# Patient Record
Sex: MALE | Race: WHITE | NOT HISPANIC OR LATINO | Employment: OTHER | URBAN - METROPOLITAN AREA
[De-identification: names, ages, dates, MRNs, and addresses within clinical notes are randomized per-mention and may not be internally consistent; named-entity substitution may affect disease eponyms.]

---

## 2017-01-09 ENCOUNTER — OFFICE VISIT (OUTPATIENT)
Dept: ENDOCRINOLOGY | Age: 27
End: 2017-01-09

## 2017-01-09 VITALS
SYSTOLIC BLOOD PRESSURE: 110 MMHG | HEIGHT: 65 IN | BODY MASS INDEX: 25.56 KG/M2 | DIASTOLIC BLOOD PRESSURE: 66 MMHG | WEIGHT: 153.4 LBS

## 2017-01-09 DIAGNOSIS — E10.9 TYPE 1 DIABETES MELLITUS WITHOUT COMPLICATION (HCC): Primary | ICD-10-CM

## 2017-01-09 DIAGNOSIS — E55.9 VITAMIN D DEFICIENCY DISEASE: ICD-10-CM

## 2017-01-09 PROCEDURE — 99214 OFFICE O/P EST MOD 30 MIN: CPT | Performed by: NURSE PRACTITIONER

## 2017-01-09 NOTE — MR AVS SNAPSHOT
Rickey Hanson   1/9/2017 3:40 PM   Office Visit    Dept Phone:  [phone]   Encounter #:  [id number]    Provider:  FRANNIE Santos   Department:  [facility] ENDOCRINOLOGY                Your Full Care Plan              Today's Medication Changes          These changes are accurate as of: 1/9/17  4:29 PM.  If you have any questions, ask your nurse or doctor.               New Medication(s)Ordered:     Urine Glucose-Ketones Test strip   Check for urine ketone when sick or if bs is greater than 250 mg/dl   Started by:  FRANNIE Santos            Where to Get Your Medications      These medications were sent to [facility] [facility] - [place], [place] - [address] [facility] - [phone] [facility] [phone]   [address] [facility] [address]     Phone:  [phone]     Urine Glucose-Ketones Test strip                  Your Updated Medication List          This list is accurate as of: 1/9/17  4:29 PM.  Always use your most recent med list.                HUMALOG 100 UNIT/ML injection   Generic drug:  insulin lispro       PX MENS MULTIVITAMINS tablet       RELION GLUCOSE TEST STRIPS test strip   Generic drug:  glucose blood       Urine Glucose-Ketones Test strip   Check for urine ketone when sick or if bs is greater than 250 mg/dl               We Performed the Following     Comprehensive Metabolic Panel     Hemoglobin A1c     Lipid Panel     MicroAlbumin, Urine, Random     T3, Free     TSH     Vitamin D 25 Hydroxy       You Were Diagnosed With        Codes Comments    Type 1 diabetes mellitus without complication    -  Primary ICD-10-CM: E10.9  ICD-9-CM: 250.01     Vitamin D deficiency disease     ICD-10-CM: E55.9  ICD-9-CM: 268.9       Instructions    Increase basal to 1.4 u/hr for 24 hours  Sick day rules reviewed     Patient Instructions History      Upcoming Appointments     Visit Type Date Time Department    OFFICE VISIT 1/9/2017   "3:40 PM [facility]    OFFICE VISIT 5/15/2017  3:20 PM [facility]      MyChart Signup     Our records indicate that you have an active [facility] [employer] account.    You can view your After Visit Summary by going to [employer] and logging in with your [employer] username and password.  If you don't have a [employer] username and password but a parent or guardian has access to your record, the parent or guardian should login with their own [employer] username and password and access your record to view the After Visit Summary.    If you have questions, you can email [email or web address] or call [phone] to talk to our [employer] staff.  Remember, [employer] is NOT to be used for urgent needs.  For medical emergencies, dial 911.               Other Info from Your Visit           Your Appointments     May 15, 2017  3:20 PM EDT   Office Visit with FRANNIE Santos   [facility] (--)    [address] [place] [id number] [phone]           Arrive 15 minutes prior to appointment.              Allergies     No Known Allergies      Reason for Visit     Diabetes no recent labs, pump download, testing BG 2 times daily      Vital Signs     Blood Pressure Height Weight Body Mass Index Smoking Status       110/66 65\" (165.1 cm) 153 lb 6.4 oz (69.6 kg) 25.53 kg/m2 Never Smoker       Problems and Diagnoses Noted     Type 1 diabetes    Vitamin D deficiency disease        "

## 2017-01-09 NOTE — PROGRESS NOTES
"Subjective   Rickey Hanson is a 26 y.o. male is here today for follow-up.  Chief Complaint   Patient presents with   • Diabetes     no recent labs, pump download, testing BG 2 times daily     Visit Vitals   • /66   • Ht 65\" (165.1 cm)   • Wt 153 lb 6.4 oz (69.6 kg)   • BMI 25.53 kg/m2       Current Outpatient Prescriptions:   •  glucose blood (RELION GLUCOSE TEST STRIPS) test strip, ReliOn Blood Glucose Test STRP; Patient Sig: ReliOn Blood Glucose Test STRP 4 TIMES DAILY OR AS NEEDED.; 0; 13-Mar-2015; Active, Disp: , Rfl:   •  insulin lispro (HUMALOG) 100 UNIT/ML injection, Inject  under the skin., Disp: , Rfl:   •  Multiple Vitamins-Minerals (PX MENS MULTIVITAMINS) tablet, Take  by mouth daily., Disp: , Rfl:   •  Urine Glucose-Ketones Test strip, Check for urine ketone when sick or if bs is greater than 250 mg/dl, Disp: 50 each, Rfl: 11  History reviewed. No pertinent family history.  Social History   Substance Use Topics   • Smoking status: Never Smoker   • Smokeless tobacco: None   • Alcohol use None     No Known Allergies      History of Present Illness  Encounter Diagnoses   Name Primary?   • Type 1 diabetes mellitus without complication Yes   • Vitamin D deficiency disease     this is a 26-year-old male patient with a history of type 1 diabetes here today for routine follow-up visit.  His pump download was reviewed as well as his sensor.  He is not checking his blood sugars and his sensor show his blood sugars stay above goal most all the time.  He has had no problems with hypoglycemia.  He is not checking his blood sugars very often.  I reviewed with him sick day rules and signs and symptoms of diabetic ketoacidosis.  He states he was sick recently and was unsure of why his blood sugars were Leonard high despite the fact he was taking insulin.  His last hemoglobin A1c reflects that his diabetes is not under good control.  His wife is pregnant with her first child who is due in 2 weeks.    The following " portions of the patient's history were reviewed and updated as appropriate: allergies, current medications, past family history, past medical history, past social history, past surgical history and problem list.    Review of Systems   Constitutional: Negative for chills.   HENT: Negative for dental problem.    Eyes: Negative for photophobia.   Respiratory: Negative for cough.    Cardiovascular: Negative for leg swelling.   Gastrointestinal: Negative for anal bleeding.   Endocrine: Negative for polyphagia.   Genitourinary: Negative for discharge.   Musculoskeletal: Negative for myalgias.   Skin: Negative for color change.   Allergic/Immunologic: Negative for food allergies.   Neurological: Negative for speech difficulty.   Hematological: Negative for adenopathy.   Psychiatric/Behavioral: The patient is not hyperactive.        Objective   Physical Exam   Constitutional: He is oriented to person, place, and time. He appears well-developed and well-nourished. No distress.   HENT:   Head: Normocephalic and atraumatic.   Right Ear: External ear normal.   Left Ear: External ear normal.   Nose: Nose normal.   Eyes: Pupils are equal, round, and reactive to light. Right eye exhibits no discharge. Left eye exhibits no discharge.   Neck: Normal range of motion. Neck supple. Carotid bruit is not present. No tracheal deviation, no edema and no erythema present. No thyromegaly present.   Cardiovascular: Normal rate, regular rhythm, normal heart sounds and intact distal pulses.  Exam reveals no gallop and no friction rub.    No murmur heard.  Pulmonary/Chest: Effort normal and breath sounds normal. No respiratory distress. He has no wheezes. He has no rales.   Abdominal: Soft. Bowel sounds are normal. He exhibits no distension. There is no tenderness.   Musculoskeletal: Normal range of motion. He exhibits no edema or deformity.   Lymphadenopathy:     He has no cervical adenopathy.   Neurological: He is alert and oriented to person,  place, and time. Coordination normal.   Skin: Skin is warm and dry. No rash noted. He is not diaphoretic. No erythema. No pallor.   Psychiatric: He has a normal mood and affect. His behavior is normal. Judgment and thought content normal.   Nursing note and vitals reviewed.        Assessment/Plan   Rickey was seen today for diabetes.    Diagnoses and all orders for this visit:    Type 1 diabetes mellitus without complication    Vitamin D deficiency disease    Other orders  -     Urine Glucose-Ketones Test strip; Check for urine ketone when sick or if bs is greater than 250 mg/dl        In summary, patient was seen and examined.  He has not had any recent labs done.  His pump download of his Medtronic pump was reviewed.  I have increased his basal starting at midnight to 1.4 units per hour for 24 hours.  He has been cautioned to monitor for hypoglycemia.  I will have him follow up with the Medtronic clinical educator for further adjustments to his pump settings as well as help with setting his sensor Ranges.  He will have extensive laboratory evaluation done today and will be notified of the results once received.  He will follow-up with Dr. Styles or myself in 4 months with labs prior.  He was educated today on sick day rules and testing for urine ketones.

## 2017-01-10 LAB
25(OH)D3+25(OH)D2 SERPL-MCNC: 18.7 NG/ML (ref 30–100)
ALBUMIN SERPL-MCNC: 4.9 G/DL (ref 3.5–5.2)
ALBUMIN/GLOB SERPL: 2.1 G/DL
ALP SERPL-CCNC: 106 U/L (ref 39–117)
ALT SERPL-CCNC: 21 U/L (ref 1–41)
AST SERPL-CCNC: 15 U/L (ref 1–40)
BILIRUB SERPL-MCNC: 0.5 MG/DL (ref 0.1–1.2)
BUN SERPL-MCNC: 17 MG/DL (ref 6–20)
BUN/CREAT SERPL: 17.2 (ref 7–25)
CALCIUM SERPL-MCNC: 10.3 MG/DL (ref 8.6–10.5)
CHLORIDE SERPL-SCNC: 100 MMOL/L (ref 98–107)
CHOLEST SERPL-MCNC: 156 MG/DL (ref 0–200)
CO2 SERPL-SCNC: 28.4 MMOL/L (ref 22–29)
CREAT SERPL-MCNC: 0.99 MG/DL (ref 0.76–1.27)
GLOBULIN SER CALC-MCNC: 2.3 GM/DL
GLUCOSE SERPL-MCNC: 133 MG/DL (ref 65–99)
HBA1C MFR BLD: 8.99 % (ref 4.8–5.6)
HDLC SERPL-MCNC: 46 MG/DL (ref 40–60)
LDLC SERPL CALC-MCNC: 77 MG/DL (ref 0–100)
MICROALBUMIN UR-MCNC: 28.7 UG/ML
POTASSIUM SERPL-SCNC: 4.2 MMOL/L (ref 3.5–5.2)
PROT SERPL-MCNC: 7.2 G/DL (ref 6–8.5)
SODIUM SERPL-SCNC: 144 MMOL/L (ref 136–145)
T3FREE SERPL-MCNC: 3.4 PG/ML (ref 2–4.4)
TRIGL SERPL-MCNC: 164 MG/DL (ref 0–150)
TSH SERPL DL<=0.005 MIU/L-ACNC: 0.85 MIU/ML (ref 0.27–4.2)
VLDLC SERPL CALC-MCNC: 32.8 MG/DL (ref 5–40)

## 2017-01-10 RX ORDER — ERGOCALCIFEROL 1.25 MG/1
CAPSULE ORAL
Qty: 12 CAPSULE | Refills: 1 | Status: SHIPPED | OUTPATIENT
Start: 2017-01-10 | End: 2017-10-02 | Stop reason: SDUPTHER

## 2017-04-25 DIAGNOSIS — IMO0002 UNCONTROLLED TYPE 1 DIABETES MELLITUS: ICD-10-CM

## 2017-04-26 DIAGNOSIS — IMO0002 UNCONTROLLED TYPE 1 DIABETES MELLITUS WITH COMPLICATION: ICD-10-CM

## 2017-05-03 DIAGNOSIS — IMO0002 UNCONTROLLED TYPE 1 DIABETES MELLITUS WITH COMPLICATION: ICD-10-CM

## 2017-10-02 ENCOUNTER — OFFICE VISIT (OUTPATIENT)
Dept: ENDOCRINOLOGY | Age: 27
End: 2017-10-02

## 2017-10-02 VITALS
SYSTOLIC BLOOD PRESSURE: 126 MMHG | DIASTOLIC BLOOD PRESSURE: 68 MMHG | HEIGHT: 65 IN | WEIGHT: 153.4 LBS | BODY MASS INDEX: 25.56 KG/M2

## 2017-10-02 DIAGNOSIS — E55.9 VITAMIN D DEFICIENCY DISEASE: ICD-10-CM

## 2017-10-02 DIAGNOSIS — IMO0001 UNCONTROLLED TYPE 1 DIABETES MELLITUS WITHOUT COMPLICATION: ICD-10-CM

## 2017-10-02 DIAGNOSIS — IMO0002 UNCONTROLLED TYPE 1 DIABETES MELLITUS WITH COMPLICATION: ICD-10-CM

## 2017-10-02 DIAGNOSIS — E10.9 TYPE 1 DIABETES MELLITUS WITHOUT COMPLICATION (HCC): ICD-10-CM

## 2017-10-02 DIAGNOSIS — IMO0002 UNCONTROLLED TYPE 1 DIABETES MELLITUS WITH COMPLICATION: Primary | ICD-10-CM

## 2017-10-02 DIAGNOSIS — Z46.81 INSULIN PUMP TITRATION: ICD-10-CM

## 2017-10-02 PROCEDURE — 99214 OFFICE O/P EST MOD 30 MIN: CPT | Performed by: NURSE PRACTITIONER

## 2017-10-02 RX ORDER — CLOTRIMAZOLE AND BETAMETHASONE DIPROPIONATE 10; .64 MG/G; MG/G
CREAM TOPICAL
Qty: 15 G | Refills: 1 | Status: SHIPPED | OUTPATIENT
Start: 2017-10-02 | End: 2018-03-29

## 2017-10-02 RX ORDER — ERGOCALCIFEROL 1.25 MG/1
CAPSULE ORAL
Qty: 12 CAPSULE | Refills: 1 | Status: SHIPPED | OUTPATIENT
Start: 2017-10-02 | End: 2017-10-03 | Stop reason: SDUPTHER

## 2017-10-02 NOTE — PROGRESS NOTES
"Subjective   Rickey Hanson is a 26 y.o. male is here today for follow-up.  Chief Complaint   Patient presents with   • Diabetes     no recent labs, testing BG 3 times, pump download   • Vitamin D Deficiency     pt is not taking vit d     /68  Ht 65\" (165.1 cm)  Wt 153 lb 6.4 oz (69.6 kg)  BMI 25.53 kg/m2  Current Outpatient Prescriptions on File Prior to Visit   Medication Sig   • glucose blood (RELION GLUCOSE TEST STRIPS) test strip ReliOn Blood Glucose Test STRP; Patient Sig: ReliOn Blood Glucose Test STRP 4 TIMES DAILY OR AS NEEDED.; 0; 13-Mar-2015; Active   • insulin lispro (HUMALOG) 100 UNIT/ML injection INJECT UP  UNITS DAILY VIA PUMP   • Multiple Vitamins-Minerals (PX MENS MULTIVITAMINS) tablet Take  by mouth daily.   • Urine Glucose-Ketones Test strip Check for urine ketone when sick or if bs is greater than 250 mg/dl   • ergocalciferol (DRISDOL) 79464 UNITS capsule Take 1 po q week     No current facility-administered medications on file prior to visit.          History of Present Illness  Encounter Diagnoses   Name Primary?   • Uncontrolled type 1 diabetes mellitus with complication Yes   • Vitamin D deficiency disease    This is a 26-year-old male patient here today for routine follow-up visit.  He has had type 1 diabetes since age 4 or 5.  He is currently on a Medtronic insulin pump.  He states his insurance company will not cover testing supplies to use the meter that.  As with the pump.  He is instead using and off brand meter to check blood sugars.  He brought with him today a weeks worth of blood sugar readings that he wrote down.  He's had no reason which to go the emergency room.  He is having high blood sugar readings according to his records.      The following portions of the patient's history were reviewed and updated as appropriate: allergies, current medications, past family history, past medical history, past social history, past surgical history and problem list.    Review " of Systems   Constitutional: Negative for fatigue.   HENT: Negative for trouble swallowing.    Eyes: Negative for visual disturbance.   Respiratory: Negative for shortness of breath.    Cardiovascular: Negative for leg swelling.   Endocrine: Negative for polyuria.   Skin: Negative for wound.   Neurological: Negative for numbness.       Objective   Physical Exam   Constitutional: He is oriented to person, place, and time. He appears well-developed and well-nourished. No distress.   HENT:   Head: Normocephalic and atraumatic.   Right Ear: External ear normal.   Left Ear: External ear normal.   Nose: Nose normal.   Eyes: Pupils are equal, round, and reactive to light. Right eye exhibits no discharge. Left eye exhibits no discharge.   Neck: Normal range of motion. Neck supple. Carotid bruit is not present. No tracheal deviation, no edema and no erythema present. No thyromegaly present.   Cardiovascular: Normal rate, regular rhythm, normal heart sounds and intact distal pulses.  Exam reveals no gallop and no friction rub.    No murmur heard.  Pulmonary/Chest: Effort normal and breath sounds normal. No respiratory distress. He has no wheezes. He has no rales.   Abdominal: Soft. Bowel sounds are normal. He exhibits no distension. There is no tenderness.   No lipoatrophy noted  Abdomen   Musculoskeletal: Normal range of motion. He exhibits no edema or deformity.    Diabetic foot exam performed: Rash on top of both feet patient shaves that her off the tops of his feet.  He does have a red raised rash that itches.   During the foot exam he had a monofilament test performed.    Vascular Status -  His exam exhibits right foot vasculature normal. His exam exhibits no right foot edema. His exam exhibits left foot vasculature normal. His exam exhibits no left foot edema.   Skin Integrity  -  His right foot skin is intact.     Rickey 's left foot skin is intact. .  Lymphadenopathy:     He has no cervical adenopathy.    Neurological: He is alert and oriented to person, place, and time. Coordination normal.   Skin: Skin is warm and dry. No rash noted. He is not diaphoretic. No erythema. No pallor.   Psychiatric: He has a normal mood and affect. His behavior is normal. Judgment and thought content normal.   Nursing note and vitals reviewed.    Office Visit on 01/09/2017   Component Date Value Ref Range Status   • Glucose 01/09/2017 133* 65 - 99 mg/dL Final   • BUN 01/09/2017 17  6 - 20 mg/dL Final   • Creatinine 01/09/2017 0.99  0.76 - 1.27 mg/dL Final   • eGFR Non African Am 01/09/2017 90  >60 mL/min/1.73 Final   • eGFR African Am 01/09/2017 109  >60 mL/min/1.73 Final   • BUN/Creatinine Ratio 01/09/2017 17.2  7.0 - 25.0 Final   • Sodium 01/09/2017 144  136 - 145 mmol/L Final   • Potassium 01/09/2017 4.2  3.5 - 5.2 mmol/L Final   • Chloride 01/09/2017 100  98 - 107 mmol/L Final   • Total CO2 01/09/2017 28.4  22.0 - 29.0 mmol/L Final   • Calcium 01/09/2017 10.3  8.6 - 10.5 mg/dL Final   • Total Protein 01/09/2017 7.2  6.0 - 8.5 g/dL Final   • Albumin 01/09/2017 4.90  3.50 - 5.20 g/dL Final   • Globulin 01/09/2017 2.3  gm/dL Final   • A/G Ratio 01/09/2017 2.1  g/dL Final   • Total Bilirubin 01/09/2017 0.5  0.1 - 1.2 mg/dL Final   • Alkaline Phosphatase 01/09/2017 106  39 - 117 U/L Final   • AST (SGOT) 01/09/2017 15  1 - 40 U/L Final   • ALT (SGPT) 01/09/2017 21  1 - 41 U/L Final   • Hemoglobin A1C 01/09/2017 8.99* 4.80 - 5.60 % Final    Comment: Hemoglobin A1C Ranges:  Increased Risk for Diabetes  5.7% to 6.4%  Diabetes                     >= 6.5%  Diabetic Goal                < 7.0%     • Total Cholesterol 01/09/2017 156  0 - 200 mg/dL Final   • Triglycerides 01/09/2017 164* 0 - 150 mg/dL Final   • HDL Cholesterol 01/09/2017 46  40 - 60 mg/dL Final   • VLDL Cholesterol 01/09/2017 32.8  5 - 40 mg/dL Final   • LDL Cholesterol  01/09/2017 77  0 - 100 mg/dL Final   • Microalbumin, Urine 01/09/2017 28.7  Not Estab. ug/mL Final   • TSH  01/09/2017 0.849  0.270 - 4.200 mIU/mL Final   • 25 Hydroxy, Vitamin D 01/09/2017 18.7* 30.0 - 100.0 ng/mL Final    Comment: Reference Range for Total Vitamin D 25(OH)  Deficiency    <20.0 ng/mL  Insufficiency 21-29 ng/mL  Sufficiency    ng/mL  Toxicity      >100 ng/ml        • T3, Free 01/09/2017 3.4  2.0 - 4.4 pg/mL Final           Assessment/Plan    Encounter Diagnoses   Name Primary?   • Uncontrolled type 1 diabetes mellitus with complication Yes   • Vitamin D deficiency disease    • Insulin pump titration        In summary, patient was seen and examined.  He will have extensive laboratory evaluation at today's visit will be notified of the results with any further recommendations.  lotrisone cream to rash twice daily.  He wrote his blood sugars because he is not entering that information into the pump.  He has continuous glucose monitoring sensors however he has not been wearing them lately.  He is requesting refills for his prescriptions as well as his pump supplies.  I advised him to please enter his blood sugar readings into his problems that will be downloaded we can look for patterns and blood sugars in relationship to meals and insulin boluses.  He was given a prescription today for Lotrisone cream for ready to rash on both feet.  He has no peripheral neuropathy.  A foot exam was performed at today's visit.  He is to follow-up in 4 months with labs prior.  Metabolically he is stable.  His last hemoglobin A1c reflects that his diabetes is not well controlled however it was significantly improved since last year when it was greater than 11.  His blood pressure is in good range.  He recently increased his basal settings hisbloodsugarreadings.  Enter blood sugars into pump

## 2017-10-03 LAB
25(OH)D3+25(OH)D2 SERPL-MCNC: 24.9 NG/ML (ref 30–100)
ALBUMIN SERPL-MCNC: 4.6 G/DL (ref 3.5–5.2)
ALBUMIN/GLOB SERPL: 1.7 G/DL
ALP SERPL-CCNC: 135 U/L (ref 39–117)
ALT SERPL-CCNC: 15 U/L (ref 1–41)
AST SERPL-CCNC: 14 U/L (ref 1–40)
BILIRUB SERPL-MCNC: 0.2 MG/DL (ref 0.1–1.2)
BUN SERPL-MCNC: 13 MG/DL (ref 6–20)
BUN/CREAT SERPL: 13.7 (ref 7–25)
CALCIUM SERPL-MCNC: 10 MG/DL (ref 8.6–10.5)
CHLORIDE SERPL-SCNC: 99 MMOL/L (ref 98–107)
CHOLEST SERPL-MCNC: 144 MG/DL (ref 0–200)
CO2 SERPL-SCNC: 24.1 MMOL/L (ref 22–29)
CREAT SERPL-MCNC: 0.95 MG/DL (ref 0.76–1.27)
FT4I SERPL CALC-MCNC: 2 (ref 1.2–4.9)
GLOBULIN SER CALC-MCNC: 2.7 GM/DL
GLUCOSE SERPL-MCNC: 265 MG/DL (ref 65–99)
HBA1C MFR BLD: 9.49 % (ref 4.8–5.6)
HDLC SERPL-MCNC: 36 MG/DL (ref 40–60)
LDLC SERPL CALC-MCNC: 80 MG/DL (ref 0–100)
POTASSIUM SERPL-SCNC: 4.2 MMOL/L (ref 3.5–5.2)
PROT SERPL-MCNC: 7.3 G/DL (ref 6–8.5)
SODIUM SERPL-SCNC: 140 MMOL/L (ref 136–145)
T3FREE SERPL-MCNC: 3.6 PG/ML (ref 2–4.4)
T3RU NFR SERPL: 29 % (ref 24–39)
T4 FREE SERPL-MCNC: 1.33 NG/DL (ref 0.93–1.7)
T4 SERPL-MCNC: 7 UG/DL (ref 4.5–12)
TRIGL SERPL-MCNC: 140 MG/DL (ref 0–150)
TSH SERPL DL<=0.005 MIU/L-ACNC: 0.82 UIU/ML (ref 0.45–4.5)
VLDLC SERPL CALC-MCNC: 28 MG/DL (ref 5–40)

## 2017-10-03 RX ORDER — ERGOCALCIFEROL 1.25 MG/1
CAPSULE ORAL
Qty: 24 CAPSULE | Refills: 1 | Status: SHIPPED | OUTPATIENT
Start: 2017-10-03 | End: 2018-03-29 | Stop reason: SDUPTHER

## 2017-10-17 DIAGNOSIS — IMO0002 UNCONTROLLED TYPE 1 DIABETES MELLITUS WITH COMPLICATION: ICD-10-CM

## 2018-03-14 DIAGNOSIS — E55.9 VITAMIN D DEFICIENCY DISEASE: Primary | ICD-10-CM

## 2018-03-14 DIAGNOSIS — IMO0002 UNCONTROLLED TYPE 1 DIABETES MELLITUS WITH COMPLICATION: ICD-10-CM

## 2018-03-15 ENCOUNTER — LAB (OUTPATIENT)
Dept: ENDOCRINOLOGY | Age: 28
End: 2018-03-15

## 2018-03-15 DIAGNOSIS — IMO0002 UNCONTROLLED TYPE 1 DIABETES MELLITUS WITH COMPLICATION: ICD-10-CM

## 2018-03-15 DIAGNOSIS — E55.9 VITAMIN D DEFICIENCY DISEASE: ICD-10-CM

## 2018-03-16 LAB
25(OH)D3+25(OH)D2 SERPL-MCNC: 21.8 NG/ML (ref 30–100)
ALBUMIN SERPL-MCNC: 4.4 G/DL (ref 3.5–5.2)
ALBUMIN/GLOB SERPL: 1.6 G/DL
ALP SERPL-CCNC: 127 U/L (ref 39–117)
ALT SERPL-CCNC: 14 U/L (ref 1–41)
AST SERPL-CCNC: 14 U/L (ref 1–40)
BILIRUB SERPL-MCNC: 0.5 MG/DL (ref 0.1–1.2)
BUN SERPL-MCNC: 15 MG/DL (ref 6–20)
BUN/CREAT SERPL: 16.9 (ref 7–25)
C PEPTIDE SERPL-MCNC: <0.1 NG/ML (ref 1.1–4.4)
CALCIUM SERPL-MCNC: 10.2 MG/DL (ref 8.6–10.5)
CHLORIDE SERPL-SCNC: 99 MMOL/L (ref 98–107)
CHOLEST SERPL-MCNC: 146 MG/DL (ref 0–200)
CO2 SERPL-SCNC: 25.9 MMOL/L (ref 22–29)
CREAT SERPL-MCNC: 0.89 MG/DL (ref 0.76–1.27)
GFR SERPLBLD CREATININE-BSD FMLA CKD-EPI: 101 ML/MIN/1.73
GFR SERPLBLD CREATININE-BSD FMLA CKD-EPI: 122 ML/MIN/1.73
GLOBULIN SER CALC-MCNC: 2.7 GM/DL
GLUCOSE SERPL-MCNC: 274 MG/DL (ref 65–99)
HBA1C MFR BLD: 10.06 % (ref 4.8–5.6)
HDLC SERPL-MCNC: 51 MG/DL (ref 40–60)
INTERPRETATION: NORMAL
LDLC SERPL CALC-MCNC: 87 MG/DL (ref 0–100)
MICROALBUMIN UR-MCNC: 16.2 UG/ML
POTASSIUM SERPL-SCNC: 4.4 MMOL/L (ref 3.5–5.2)
PROT SERPL-MCNC: 7.1 G/DL (ref 6–8.5)
SODIUM SERPL-SCNC: 140 MMOL/L (ref 136–145)
TRIGL SERPL-MCNC: 42 MG/DL (ref 0–150)
VLDLC SERPL CALC-MCNC: 8.4 MG/DL (ref 5–40)

## 2018-03-29 ENCOUNTER — OFFICE VISIT (OUTPATIENT)
Dept: ENDOCRINOLOGY | Age: 28
End: 2018-03-29

## 2018-03-29 VITALS
SYSTOLIC BLOOD PRESSURE: 122 MMHG | WEIGHT: 154 LBS | HEIGHT: 65 IN | BODY MASS INDEX: 25.66 KG/M2 | DIASTOLIC BLOOD PRESSURE: 82 MMHG

## 2018-03-29 DIAGNOSIS — E55.9 VITAMIN D DEFICIENCY DISEASE: ICD-10-CM

## 2018-03-29 DIAGNOSIS — IMO0002 UNCONTROLLED TYPE 1 DIABETES MELLITUS WITH COMPLICATION: Primary | ICD-10-CM

## 2018-03-29 DIAGNOSIS — Z46.81 INSULIN PUMP TITRATION: ICD-10-CM

## 2018-03-29 PROCEDURE — 99214 OFFICE O/P EST MOD 30 MIN: CPT | Performed by: NURSE PRACTITIONER

## 2018-03-29 RX ORDER — ERGOCALCIFEROL 1.25 MG/1
CAPSULE ORAL
Qty: 24 CAPSULE | Refills: 1 | Status: SHIPPED | OUTPATIENT
Start: 2018-03-29 | End: 2018-03-29 | Stop reason: SDUPTHER

## 2018-03-29 RX ORDER — ERGOCALCIFEROL 1.25 MG/1
CAPSULE ORAL
Qty: 24 CAPSULE | Refills: 1 | Status: SHIPPED | OUTPATIENT
Start: 2018-03-29

## 2018-03-29 NOTE — PROGRESS NOTES
"Subjective   Rickey Hanson is a 27 y.o. male is here today for follow-up.  Chief Complaint   Patient presents with   • Diabetes     recent labs, pt tests BG 3x daily, pt on pump   • Vitamin D Deficiency   • insulin pump titration     /82   Ht 165.1 cm (65\")   Wt 69.9 kg (154 lb)   BMI 25.63 kg/m²   Current Outpatient Prescriptions on File Prior to Visit   Medication Sig   • glucose blood (RELION GLUCOSE TEST STRIPS) test strip Use 4 times daily   • insulin lispro (HUMALOG) 100 UNIT/ML injection INJECT UP  UNITS DAILY VIA PUMP   • Urine Glucose-Ketones Test strip Check for urine ketone when sick or if bs is greater than 250 mg/dl   • [DISCONTINUED] ergocalciferol (DRISDOL) 59842 units capsule Take 1 po twice q week   • [DISCONTINUED] clotrimazole-betamethasone (LOTRISONE) 1-0.05 % cream Apply to affected area 2 times daily   • [DISCONTINUED] Multiple Vitamins-Minerals (PX MENS MULTIVITAMINS) tablet Take  by mouth daily.     No current facility-administered medications on file prior to visit.      History reviewed. No pertinent family history.  Social History   Substance Use Topics   • Smoking status: Never Smoker   • Smokeless tobacco: Not on file   • Alcohol use Not on file     No Known Allergies      History of Present Illness  Encounter Diagnoses   Name Primary?   • Uncontrolled type 1 diabetes mellitus with complication Yes   • Vitamin D deficiency disease    • Insulin pump titration      This is a 27-year-old male patient here today for routine follow-up visit.  He is being seen for the above-mentioned problems.  He is currently on a Medtronic insulin pump and does have continuous glucose monitoring sensors however he states there extensively using he does not use it very often.  He also does not check his blood sugars very often.  He sometimes will override the insulin pump to calculate on his own his insulin bolus.  His pump download was reviewed.  He does have high blood sugars in the morning " but will correct with insulin according to his blood sugar results.  He is complaining of that his cost of diabetic supplies is costing more than $1700 per month.  We discussed options for other insulin to using his pump as well as providing coupons  The following portions of the patient's history were reviewed and updated as appropriate: allergies, current medications, past family history, past medical history, past social history, past surgical history and problem list.    Review of Systems   Constitutional: Negative for fatigue.   HENT: Negative for trouble swallowing.    Eyes: Negative for visual disturbance.   Respiratory: Negative for shortness of breath.    Cardiovascular: Negative for leg swelling.   Endocrine: Negative for polyuria.   Skin: Negative for wound.   Neurological: Negative for numbness.       Objective   Physical Exam   Constitutional: He is oriented to person, place, and time. He appears well-developed and well-nourished. No distress.   HENT:   Head: Normocephalic and atraumatic.   Right Ear: External ear normal.   Left Ear: External ear normal.   Nose: Nose normal.   Eyes: Pupils are equal, round, and reactive to light. Right eye exhibits no discharge. Left eye exhibits no discharge.   Neck: Normal range of motion. Neck supple. Carotid bruit is not present. No tracheal deviation, no edema and no erythema present. No thyromegaly present.   Cardiovascular: Normal rate, regular rhythm, normal heart sounds and intact distal pulses.  Exam reveals no gallop and no friction rub.    No murmur heard.  Pulmonary/Chest: Effort normal and breath sounds normal. No respiratory distress. He has no wheezes. He has no rales.   Abdominal: Soft. Bowel sounds are normal. He exhibits no distension. There is no tenderness.   No lipoatrophy noted  Abdomen   Musculoskeletal: Normal range of motion. He exhibits no edema or deformity.    Diabetic foot exam performed: previous rash on feet has resolved with changing his  shoes.    During the foot exam he had a monofilament test not performed.  Vascular Status -  His right foot exhibits normal foot vasculature  and no edema. His left foot exhibits normal foot vasculature  and no edema.  Skin Integrity  -  His right foot skin is intact.His left foot skin is intact..  Lymphadenopathy:     He has no cervical adenopathy.   Neurological: He is alert and oriented to person, place, and time. Coordination normal.   Skin: Skin is warm and dry. No rash noted. He is not diaphoretic. No erythema. No pallor.   Psychiatric: He has a normal mood and affect. His behavior is normal. Judgment and thought content normal.   Nursing note and vitals reviewed.    Results for orders placed or performed in visit on 03/15/18   Comprehensive Metabolic Panel   Result Value Ref Range    Glucose 274 (H) 65 - 99 mg/dL    BUN 15 6 - 20 mg/dL    Creatinine 0.89 0.76 - 1.27 mg/dL    eGFR Non African Am 101 >60 mL/min/1.73    eGFR African Am 122 >60 mL/min/1.73    BUN/Creatinine Ratio 16.9 7.0 - 25.0    Sodium 140 136 - 145 mmol/L    Potassium 4.4 3.5 - 5.2 mmol/L    Chloride 99 98 - 107 mmol/L    Total CO2 25.9 22.0 - 29.0 mmol/L    Calcium 10.2 8.6 - 10.5 mg/dL    Total Protein 7.1 6.0 - 8.5 g/dL    Albumin 4.40 3.50 - 5.20 g/dL    Globulin 2.7 gm/dL    A/G Ratio 1.6 g/dL    Total Bilirubin 0.5 0.1 - 1.2 mg/dL    Alkaline Phosphatase 127 (H) 39 - 117 U/L    AST (SGOT) 14 1 - 40 U/L    ALT (SGPT) 14 1 - 41 U/L   Hemoglobin A1c   Result Value Ref Range    Hemoglobin A1C 10.06 (H) 4.80 - 5.60 %   Lipid Panel   Result Value Ref Range    Total Cholesterol 146 0 - 200 mg/dL    Triglycerides 42 0 - 150 mg/dL    HDL Cholesterol 51 40 - 60 mg/dL    VLDL Cholesterol 8.4 5 - 40 mg/dL    LDL Cholesterol  87 0 - 100 mg/dL   C-Peptide   Result Value Ref Range    C-Peptide <0.1 (L) 1.1 - 4.4 ng/mL   Vitamin D 25 Hydroxy   Result Value Ref Range    25 Hydroxy, Vitamin D 21.8 (L) 30.0 - 100.0 ng/ml   MicroAlbumin, Urine, Random    Result Value Ref Range    Microalbumin, Urine 16.2 Not Estab. ug/mL   Cardiovascular Risk Assessment   Result Value Ref Range    Interpretation Note          Assessment/Plan   Problems Addressed this Visit        Digestive    Vitamin D deficiency disease       Endocrine    Uncontrolled type 1 diabetes mellitus - Primary    Relevant Medications    Insulin Glulisine 100 UNIT/ML solution pen-injector       Other    Insulin pump titration      Other Visit Diagnoses    None.       In summary, patient was seen and examined.  His pump download was reviewed and changes were made to his basal settings starting at midnight to 1.45.  He has been cautioned to monitor blood sugars closely and to especially be cautious of hypoglycemic events with the insulin increase.  His hemoglobin A1c reflects that his diabetes is not well controlled.  He is not checking his blood sugars as often as needed.  He states if he uses his pump correctly it does work to keep blood sugars under better control. Metabolically he is stable. He will follow up in 4 mo with labs prior. He is advised to check bs's more often and notify office if bs's do not improve. Vit d 50 k twice weekly for vit d def. he has been taking daily over-the-counter vitamin D instead of the prescribed prescription strength.

## 2018-08-02 DIAGNOSIS — E55.9 VITAMIN D DEFICIENCY DISEASE: ICD-10-CM

## 2018-08-02 DIAGNOSIS — IMO0002 UNCONTROLLED TYPE 1 DIABETES MELLITUS WITH COMPLICATION: Primary | ICD-10-CM

## 2018-08-07 ENCOUNTER — TELEPHONE (OUTPATIENT)
Dept: ENDOCRINOLOGY | Age: 28
End: 2018-08-07

## 2018-09-02 ENCOUNTER — RESULTS ENCOUNTER (OUTPATIENT)
Dept: ENDOCRINOLOGY | Age: 28
End: 2018-09-02

## 2018-09-02 DIAGNOSIS — IMO0002 UNCONTROLLED TYPE 1 DIABETES MELLITUS WITH COMPLICATION: ICD-10-CM

## 2018-09-02 DIAGNOSIS — E55.9 VITAMIN D DEFICIENCY DISEASE: ICD-10-CM

## 2021-04-16 ENCOUNTER — BULK ORDERING (OUTPATIENT)
Dept: CASE MANAGEMENT | Facility: OTHER | Age: 31
End: 2021-04-16

## 2021-04-16 DIAGNOSIS — Z23 IMMUNIZATION DUE: ICD-10-CM
